# Patient Record
Sex: FEMALE | Race: WHITE
[De-identification: names, ages, dates, MRNs, and addresses within clinical notes are randomized per-mention and may not be internally consistent; named-entity substitution may affect disease eponyms.]

---

## 2017-12-20 ENCOUNTER — HOSPITAL ENCOUNTER (INPATIENT)
Dept: HOSPITAL 12 - ER | Age: 57
LOS: 4 days | Discharge: HOME | DRG: 241 | End: 2017-12-24
Attending: INTERNAL MEDICINE
Payer: COMMERCIAL

## 2017-12-20 VITALS — SYSTOLIC BLOOD PRESSURE: 91 MMHG | DIASTOLIC BLOOD PRESSURE: 43 MMHG

## 2017-12-20 VITALS — SYSTOLIC BLOOD PRESSURE: 101 MMHG | DIASTOLIC BLOOD PRESSURE: 51 MMHG

## 2017-12-20 VITALS — SYSTOLIC BLOOD PRESSURE: 95 MMHG | DIASTOLIC BLOOD PRESSURE: 51 MMHG

## 2017-12-20 VITALS — DIASTOLIC BLOOD PRESSURE: 67 MMHG | SYSTOLIC BLOOD PRESSURE: 125 MMHG

## 2017-12-20 VITALS — HEIGHT: 66 IN | BODY MASS INDEX: 14.63 KG/M2 | WEIGHT: 91 LBS

## 2017-12-20 VITALS — SYSTOLIC BLOOD PRESSURE: 80 MMHG | DIASTOLIC BLOOD PRESSURE: 44 MMHG

## 2017-12-20 VITALS — SYSTOLIC BLOOD PRESSURE: 86 MMHG | DIASTOLIC BLOOD PRESSURE: 57 MMHG

## 2017-12-20 VITALS — DIASTOLIC BLOOD PRESSURE: 57 MMHG | SYSTOLIC BLOOD PRESSURE: 86 MMHG

## 2017-12-20 VITALS — SYSTOLIC BLOOD PRESSURE: 97 MMHG | DIASTOLIC BLOOD PRESSURE: 50 MMHG

## 2017-12-20 VITALS — SYSTOLIC BLOOD PRESSURE: 91 MMHG | DIASTOLIC BLOOD PRESSURE: 51 MMHG

## 2017-12-20 VITALS — DIASTOLIC BLOOD PRESSURE: 46 MMHG | SYSTOLIC BLOOD PRESSURE: 91 MMHG

## 2017-12-20 VITALS — DIASTOLIC BLOOD PRESSURE: 44 MMHG | SYSTOLIC BLOOD PRESSURE: 86 MMHG

## 2017-12-20 VITALS — DIASTOLIC BLOOD PRESSURE: 47 MMHG | SYSTOLIC BLOOD PRESSURE: 95 MMHG

## 2017-12-20 VITALS — DIASTOLIC BLOOD PRESSURE: 52 MMHG | SYSTOLIC BLOOD PRESSURE: 95 MMHG

## 2017-12-20 VITALS — DIASTOLIC BLOOD PRESSURE: 45 MMHG | SYSTOLIC BLOOD PRESSURE: 92 MMHG

## 2017-12-20 VITALS — SYSTOLIC BLOOD PRESSURE: 88 MMHG | DIASTOLIC BLOOD PRESSURE: 53 MMHG

## 2017-12-20 VITALS — DIASTOLIC BLOOD PRESSURE: 55 MMHG | SYSTOLIC BLOOD PRESSURE: 88 MMHG

## 2017-12-20 VITALS — DIASTOLIC BLOOD PRESSURE: 50 MMHG | SYSTOLIC BLOOD PRESSURE: 105 MMHG

## 2017-12-20 VITALS — DIASTOLIC BLOOD PRESSURE: 47 MMHG | SYSTOLIC BLOOD PRESSURE: 88 MMHG

## 2017-12-20 VITALS — DIASTOLIC BLOOD PRESSURE: 47 MMHG | SYSTOLIC BLOOD PRESSURE: 89 MMHG

## 2017-12-20 DIAGNOSIS — I95.1: ICD-10-CM

## 2017-12-20 DIAGNOSIS — E86.0: ICD-10-CM

## 2017-12-20 DIAGNOSIS — B96.81: ICD-10-CM

## 2017-12-20 DIAGNOSIS — R22.0: ICD-10-CM

## 2017-12-20 DIAGNOSIS — Z80.41: ICD-10-CM

## 2017-12-20 DIAGNOSIS — K25.4: Primary | ICD-10-CM

## 2017-12-20 DIAGNOSIS — J44.9: ICD-10-CM

## 2017-12-20 DIAGNOSIS — K59.00: ICD-10-CM

## 2017-12-20 DIAGNOSIS — E83.51: ICD-10-CM

## 2017-12-20 DIAGNOSIS — Z80.0: ICD-10-CM

## 2017-12-20 DIAGNOSIS — Z80.1: ICD-10-CM

## 2017-12-20 DIAGNOSIS — J32.0: ICD-10-CM

## 2017-12-20 DIAGNOSIS — E83.42: ICD-10-CM

## 2017-12-20 DIAGNOSIS — I07.1: ICD-10-CM

## 2017-12-20 DIAGNOSIS — E53.8: ICD-10-CM

## 2017-12-20 DIAGNOSIS — K29.70: ICD-10-CM

## 2017-12-20 DIAGNOSIS — E43: ICD-10-CM

## 2017-12-20 DIAGNOSIS — R32: ICD-10-CM

## 2017-12-20 DIAGNOSIS — F32.9: ICD-10-CM

## 2017-12-20 DIAGNOSIS — G93.40: ICD-10-CM

## 2017-12-20 DIAGNOSIS — R57.1: ICD-10-CM

## 2017-12-20 DIAGNOSIS — M48.56XA: ICD-10-CM

## 2017-12-20 DIAGNOSIS — E87.2: ICD-10-CM

## 2017-12-20 DIAGNOSIS — K22.8: ICD-10-CM

## 2017-12-20 DIAGNOSIS — E83.39: ICD-10-CM

## 2017-12-20 DIAGNOSIS — C16.0: ICD-10-CM

## 2017-12-20 DIAGNOSIS — G89.29: ICD-10-CM

## 2017-12-20 DIAGNOSIS — M43.12: ICD-10-CM

## 2017-12-20 DIAGNOSIS — M19.90: ICD-10-CM

## 2017-12-20 DIAGNOSIS — D62: ICD-10-CM

## 2017-12-20 DIAGNOSIS — F17.210: ICD-10-CM

## 2017-12-20 DIAGNOSIS — C15.8: ICD-10-CM

## 2017-12-20 LAB
ALP SERPL-CCNC: 40 U/L (ref 50–136)
ALP SERPL-CCNC: 43 U/L (ref 50–136)
ALT SERPL W/O P-5'-P-CCNC: 7 U/L (ref 14–59)
ALT SERPL W/O P-5'-P-CCNC: 9 U/L (ref 14–59)
AST SERPL-CCNC: 14 U/L (ref 15–37)
AST SERPL-CCNC: 17 U/L (ref 15–37)
BASOPHILS # BLD AUTO: 0 K/UL (ref 0–8)
BASOPHILS # BLD AUTO: 0 K/UL (ref 0–8)
BASOPHILS NFR BLD AUTO: 0.3 % (ref 0–2)
BASOPHILS NFR BLD AUTO: 0.4 % (ref 0–2)
BILIRUB DIRECT SERPL-MCNC: 0.1 MG/DL (ref 0–0.2)
BILIRUB SERPL-MCNC: 0.4 MG/DL (ref 0.2–1)
BILIRUB SERPL-MCNC: 0.6 MG/DL (ref 0.2–1)
BUN SERPL-MCNC: 35 MG/DL (ref 7–18)
BUN SERPL-MCNC: 55 MG/DL (ref 7–18)
CHLORIDE SERPL-SCNC: 107 MMOL/L (ref 98–107)
CHLORIDE SERPL-SCNC: 110 MMOL/L (ref 98–107)
CO2 SERPL-SCNC: 28 MMOL/L (ref 21–32)
CO2 SERPL-SCNC: 29 MMOL/L (ref 21–32)
CREAT SERPL-MCNC: 0.6 MG/DL (ref 0.6–1.3)
CREAT SERPL-MCNC: 0.8 MG/DL (ref 0.6–1.3)
EOSINOPHIL # BLD AUTO: 0 K/UL (ref 0–0.7)
EOSINOPHIL # BLD AUTO: 0.1 K/UL (ref 0–0.7)
EOSINOPHIL NFR BLD AUTO: 0.3 % (ref 0–7)
EOSINOPHIL NFR BLD AUTO: 0.7 % (ref 0–7)
GLUCOSE SERPL-MCNC: 123 MG/DL (ref 74–106)
GLUCOSE SERPL-MCNC: 81 MG/DL (ref 74–106)
HCT VFR BLD AUTO: 19.8 % (ref 31.2–41.9)
HCT VFR BLD AUTO: 22.7 % (ref 31.2–41.9)
HCT VFR BLD AUTO: 26.7 % (ref 31.2–41.9)
HGB BLD-MCNC: 6.5 G/DL (ref 10.9–14.3)
HGB BLD-MCNC: 7.5 G/DL (ref 10.9–14.3)
HGB BLD-MCNC: 9.1 G/DL (ref 10.9–14.3)
IRON SERPL-MCNC: 34 UG/DL (ref 50–175)
LYMPHOCYTES # BLD AUTO: 1.4 K/UL (ref 20–40)
LYMPHOCYTES # BLD AUTO: 1.7 K/UL (ref 20–40)
LYMPHOCYTES NFR BLD AUTO: 12 % (ref 20.5–51.5)
LYMPHOCYTES NFR BLD AUTO: 16.1 % (ref 20.5–51.5)
LYMPHOCYTES NFR BLD MANUAL: 11 % (ref 20–40)
MAGNESIUM SERPL-MCNC: 1.6 MG/DL (ref 1.8–2.4)
MCH RBC QN AUTO: 27.8 UUG (ref 24.7–32.8)
MCH RBC QN AUTO: 28 UUG (ref 24.7–32.8)
MCHC RBC AUTO-ENTMCNC: 33 G/DL (ref 32.3–35.6)
MCHC RBC AUTO-ENTMCNC: 33 G/DL (ref 32.3–35.6)
MCV RBC AUTO: 84.4 FL (ref 75.5–95.3)
MCV RBC AUTO: 85.4 FL (ref 75.5–95.3)
MONOCYTES # BLD AUTO: 0.4 K/UL (ref 2–10)
MONOCYTES # BLD AUTO: 0.8 K/UL (ref 2–10)
MONOCYTES NFR BLD AUTO: 5.2 % (ref 0–11)
MONOCYTES NFR BLD AUTO: 6 % (ref 0–11)
MONOCYTES NFR BLD MANUAL: 6 % (ref 2–10)
NEUTROPHILS # BLD AUTO: 11.5 K/UL (ref 1.8–8.9)
NEUTROPHILS # BLD AUTO: 6.6 K/UL (ref 1.8–8.9)
NEUTROPHILS NFR BLD AUTO: 77.6 % (ref 38.5–71.5)
NEUTROPHILS NFR BLD AUTO: 81.4 % (ref 38.5–71.5)
NEUTS SEG NFR BLD MANUAL: 83 % (ref 42–75)
PHOSPHATE SERPL-MCNC: 2.2 MG/DL (ref 2.5–4.9)
PLATELET # BLD AUTO: 220 K/UL (ref 179–408)
PLATELET # BLD AUTO: 272 K/UL (ref 179–408)
POTASSIUM SERPL-SCNC: 3.8 MMOL/L (ref 3.5–5.1)
POTASSIUM SERPL-SCNC: 3.8 MMOL/L (ref 3.5–5.1)
RBC # BLD AUTO: 2.32 MIL/UL (ref 3.63–4.92)
RBC # BLD AUTO: 2.69 MIL/UL (ref 3.63–4.92)
WBC # BLD AUTO: 14.1 K/UL (ref 3.8–11.8)
WBC # BLD AUTO: 8.6 K/UL (ref 3.8–11.8)
WS STN SPEC: 5 G/DL (ref 6.4–8.2)
WS STN SPEC: 5 G/DL (ref 6.4–8.2)

## 2017-12-20 PROCEDURE — P9021 RED BLOOD CELLS UNIT: HCPCS

## 2017-12-20 PROCEDURE — C9113 INJ PANTOPRAZOLE SODIUM, VIA: HCPCS

## 2017-12-20 PROCEDURE — A4217 STERILE WATER/SALINE, 500 ML: HCPCS

## 2017-12-20 PROCEDURE — 30233N1 TRANSFUSION OF NONAUTOLOGOUS RED BLOOD CELLS INTO PERIPHERAL VEIN, PERCUTANEOUS APPROACH: ICD-10-PCS | Performed by: INTERNAL MEDICINE

## 2017-12-20 PROCEDURE — A4663 DIALYSIS BLOOD PRESSURE CUFF: HCPCS

## 2017-12-20 RX ADMIN — CYANOCOBALAMIN SCH MCG: 1000 INJECTION, SOLUTION INTRAMUSCULAR at 22:31

## 2017-12-20 RX ADMIN — NICOTINE SCH MG: 21 PATCH, EXTENDED RELEASE TOPICAL at 21:49

## 2017-12-20 RX ADMIN — DEXTROSE SCH MG: 50 INJECTION, SOLUTION INTRAVENOUS at 07:20

## 2017-12-20 RX ADMIN — DEXTROSE PRN MLS/HR: 50 INJECTION, SOLUTION INTRAVENOUS at 21:47

## 2017-12-20 RX ADMIN — DEXTROSE SCH MG: 50 INJECTION, SOLUTION INTRAVENOUS at 21:48

## 2017-12-20 RX ADMIN — DEXTROSE SCH MLS/HR: 50 INJECTION, SOLUTION INTRAVENOUS at 21:47

## 2017-12-20 NOTE — NUR
Pt well tolerated both blood transfusion bags latest v/s 94/51, 75 HR, 15 RR, 100% on RA, 
and temp 98.6. Tylenol administered per PRN elevated temp and effectively reduced from 100.1 
previously. Pt denies any SOB, c/o pain, and discomfort. Plan of care discussed for EGD to 
be done first thing in the morning and verbalized understanding of NPO status after 
midnight, with mechanical soft diet approved for prior snacks and meds. All safety and 
comfort measures met. Call light within and daughter visiting at bedside. Will continue to 
monitor and endorse shift changes to oncoming night shift.

## 2017-12-20 NOTE — NUR
PT IN ROOM ALERT IN NO ACUTE DISTRESS. NO EPISODES OF VOMITTING OR GI BLEEDING. ABLE TO MAKE 
NEEDS KNOWN. CALL MADE TO DR BARAKAT AWAITING FOR ADMITTING ORDERS. CONTINUE TO 
MONITOR. 3 SIDE RAILS RAISED. CALL LIGHT PLACED WITHIN REACH. PT REMAINING NPO STATUS AT 
THIS TIME.

## 2017-12-20 NOTE — NUR
Pt. admitted to [facility]  , under care of Dr. ROSY Gallagher List completed.

REPORT GIVEN TO ABBY RECINOS

## 2017-12-20 NOTE — NUR
Pt seen by MD, new orders received. Will continue to monitor Pt and endorse to night shift. 
Call light within reach and family at bedside. Pt reports feeling fine following blood 
transfusions with no c/o pain and able to void with ease.

## 2017-12-20 NOTE — NUR
PT WAS NOT GIVEN THE MORNING DOSE OF IV PROTONIX SINCE THERE WAS A NIGHT TIME DOES ORDERED 
FOR THE TIME. PT WAS NOT GIVEN POTASSIUM PHOSPHATE IVPB BECAUSE NONE WAS AVAILABLE. MD 
NOTIFIED AND D/C'ED THE ORDER.

## 2017-12-20 NOTE — NUR
SBAR report received near bedside. Pt assessed to be in no acute distress, no pain, no SOB. 
Pt initial v/s 97/50, 99% RA, 19 rr, 98.3, 76 HR. No s/s of bleeding at this time. Pt able 
to void x1 without complication. Will initiate first of two blood bags for transfusion and 
document accordingly. Reportable s/s of allergic reaction discussed. Will continue to 
monitor closely.

## 2017-12-20 NOTE — NUR
PT IN ROOM ALERT AWAKE ADMITTED TO TELEMETRY IN NO ACUTE DISTRESS. NO EPISODE OF VOMITTING 
AT THIS TIME. ABLE TO FOLLOW SIMPLE COMMANDS. PT STATES DIZZINESS AND TO BE INFUSED WITH 
PRBCS AS PER MD. CALL LIGHT PLACED WITHIN REACH. CONTINUE TO MONITOR.

## 2017-12-21 VITALS — SYSTOLIC BLOOD PRESSURE: 109 MMHG | DIASTOLIC BLOOD PRESSURE: 47 MMHG

## 2017-12-21 VITALS — DIASTOLIC BLOOD PRESSURE: 58 MMHG | SYSTOLIC BLOOD PRESSURE: 120 MMHG

## 2017-12-21 VITALS — SYSTOLIC BLOOD PRESSURE: 124 MMHG | DIASTOLIC BLOOD PRESSURE: 65 MMHG

## 2017-12-21 VITALS — DIASTOLIC BLOOD PRESSURE: 49 MMHG | SYSTOLIC BLOOD PRESSURE: 104 MMHG

## 2017-12-21 VITALS — DIASTOLIC BLOOD PRESSURE: 42 MMHG | SYSTOLIC BLOOD PRESSURE: 109 MMHG

## 2017-12-21 LAB
ALP SERPL-CCNC: 39 U/L (ref 50–136)
ALT SERPL W/O P-5'-P-CCNC: 7 U/L (ref 14–59)
APPEARANCE UR: CLEAR
AST SERPL-CCNC: 19 U/L (ref 15–37)
BASOPHILS # BLD AUTO: 0 K/UL (ref 0–8)
BASOPHILS NFR BLD AUTO: 0.4 % (ref 0–2)
BILIRUB SERPL-MCNC: 0.4 MG/DL (ref 0.2–1)
BILIRUB UR QL STRIP: NEGATIVE
BUN SERPL-MCNC: 18 MG/DL (ref 7–18)
CANCER AG125 SERPL-ACNC: 31 U/ML (ref 0–38.1)
CANCER AG19-9 SERPL-ACNC: 4 U/ML (ref 0–35)
CHLORIDE SERPL-SCNC: 112 MMOL/L (ref 98–107)
CHOLEST SERPL-MCNC: 100 MG/DL (ref ?–200)
CO2 SERPL-SCNC: 29 MMOL/L (ref 21–32)
COLOR UR: YELLOW
CREAT SERPL-MCNC: 0.6 MG/DL (ref 0.6–1.3)
DEPRECATED SQUAMOUS URNS QL MICRO: (no result) /HPF
EOSINOPHIL # BLD AUTO: 0.2 K/UL (ref 0–0.7)
EOSINOPHIL NFR BLD AUTO: 2.4 % (ref 0–7)
GLUCOSE SERPL-MCNC: 94 MG/DL (ref 74–106)
GLUCOSE UR STRIP-MCNC: NEGATIVE MG/DL
HCT VFR BLD AUTO: 26.5 % (ref 31.2–41.9)
HCT VFR BLD AUTO: 26.6 % (ref 31.2–41.9)
HDLC SERPL-MCNC: 32 MG/DL (ref 40–60)
HGB BLD-MCNC: 9.1 G/DL (ref 10.9–14.3)
HGB BLD-MCNC: 9.2 G/DL (ref 10.9–14.3)
KETONES UR STRIP-MCNC: NEGATIVE MG/DL
LEUKOCYTE ESTERASE UR QL STRIP: NEGATIVE
LYMPHOCYTES # BLD AUTO: 1.3 K/UL (ref 20–40)
LYMPHOCYTES NFR BLD AUTO: 20.3 % (ref 20.5–51.5)
MAGNESIUM SERPL-MCNC: 2 MG/DL (ref 1.8–2.4)
MCH RBC QN AUTO: 28.8 UUG (ref 24.7–32.8)
MCHC RBC AUTO-ENTMCNC: 34 G/DL (ref 32.3–35.6)
MCV RBC AUTO: 84.6 FL (ref 75.5–95.3)
MONOCYTES # BLD AUTO: 0.4 K/UL (ref 2–10)
MONOCYTES NFR BLD AUTO: 6.4 % (ref 0–11)
NEUTROPHILS # BLD AUTO: 4.6 K/UL (ref 1.8–8.9)
NEUTROPHILS NFR BLD AUTO: 70.5 % (ref 38.5–71.5)
NITRITE UR QL STRIP: NEGATIVE
PH UR STRIP: 6 [PH] (ref 5–8)
PHOSPHATE SERPL-MCNC: 1.8 MG/DL (ref 2.5–4.9)
PLATELET # BLD AUTO: 207 K/UL (ref 179–408)
POTASSIUM SERPL-SCNC: 3.9 MMOL/L (ref 3.5–5.1)
PROT UR QL STRIP: NEGATIVE
RBC # BLD AUTO: 3.14 MIL/UL (ref 3.63–4.92)
RBC #/AREA URNS HPF: (no result) /HPF (ref 0–3)
SP GR UR STRIP: 1.01 (ref 1–1.03)
TRIGL SERPL-MCNC: 89 MG/DL (ref 30–150)
TSH SERPL DL<=0.005 MIU/L-ACNC: 0.53 MIU/ML (ref 0.36–3.74)
UROBILINOGEN UR STRIP-MCNC: 0.2 E.U./DL
WBC # BLD AUTO: 6.6 K/UL (ref 3.8–11.8)
WBC #/AREA URNS HPF: (no result) /HPF
WBC #/AREA URNS HPF: (no result) /HPF (ref 0–3)
WS STN SPEC: 4.7 G/DL (ref 6.4–8.2)

## 2017-12-21 PROCEDURE — 0DB48ZX EXCISION OF ESOPHAGOGASTRIC JUNCTION, VIA NATURAL OR ARTIFICIAL OPENING ENDOSCOPIC, DIAGNOSTIC: ICD-10-PCS | Performed by: SURGERY

## 2017-12-21 PROCEDURE — 0DB78ZX EXCISION OF STOMACH, PYLORUS, VIA NATURAL OR ARTIFICIAL OPENING ENDOSCOPIC, DIAGNOSTIC: ICD-10-PCS | Performed by: SURGERY

## 2017-12-21 RX ADMIN — DOCUSATE SODIUM SCH MG: 100 CAPSULE, LIQUID FILLED ORAL at 13:47

## 2017-12-21 RX ADMIN — CYANOCOBALAMIN SCH MCG: 1000 INJECTION, SOLUTION INTRAMUSCULAR at 08:44

## 2017-12-21 RX ADMIN — DEXTROSE PRN MLS/HR: 50 INJECTION, SOLUTION INTRAVENOUS at 12:22

## 2017-12-21 RX ADMIN — DEXTROSE SCH MG: 50 INJECTION, SOLUTION INTRAVENOUS at 08:44

## 2017-12-21 RX ADMIN — NICOTINE SCH MG: 21 PATCH, EXTENDED RELEASE TOPICAL at 08:44

## 2017-12-21 RX ADMIN — PANTOPRAZOLE SODIUM SCH MG: 40 TABLET, DELAYED RELEASE ORAL at 17:11

## 2017-12-21 RX ADMIN — DOCUSATE SODIUM SCH MG: 100 CAPSULE, LIQUID FILLED ORAL at 20:37

## 2017-12-21 RX ADMIN — DEXTROSE SCH MLS/HR: 50 INJECTION, SOLUTION INTRAVENOUS at 11:18

## 2017-12-21 NOTE — NUR
NSG: RECEIVED PT LAYING IN BED IN HER ROOM ,ALERT/ORIENTED IN NO ACUTE DISTRESS. NO EPISODES 
OF VOMITTING OR GI BLEEDING. ABLE TO MAKE NEEDS KNOWN. CONTINUE TO MONITOR.  SIDE RAILS 
RAISED. CALL LIGHT PLACED WITHIN REACH.

## 2017-12-21 NOTE — NUR
PT SLEPT WELL THROUGH THE NIGHT AND WAS EASILY AWOKEN, PT DENIED HAVING ANY PAIN OR 
DIFFICULTY BREATHING, PT DENIED HAVING ANY NAUSEA. PT DID HAVE ONE EPISODE OF DIZZINESS 
WHILE STANDING AT START OF SHIFT.PT HAS BEEN NPO AFTER MIDNIGHT, NO S/S OF BLEEDING NOTED. 
ALL NEEDS MET, SAFETY MEASURES ARE IN PLACE, CALL LIGHT WITHIN REACH, BED ALARM IS ON.

## 2017-12-21 NOTE — NUR
Patient been in bed resting. No s/s of distress noted. Patient been cooperative with all 
procedures and interventions. Iv fluids are running now. Two iv sites are intact. Patient 
been able to tolerate her meals well, no n/v during my shift. Safety and comfort provided. 
Will continue monitoring.

## 2017-12-22 VITALS — SYSTOLIC BLOOD PRESSURE: 94 MMHG | DIASTOLIC BLOOD PRESSURE: 53 MMHG

## 2017-12-22 VITALS — DIASTOLIC BLOOD PRESSURE: 49 MMHG | SYSTOLIC BLOOD PRESSURE: 100 MMHG

## 2017-12-22 VITALS — DIASTOLIC BLOOD PRESSURE: 72 MMHG | SYSTOLIC BLOOD PRESSURE: 157 MMHG

## 2017-12-22 VITALS — DIASTOLIC BLOOD PRESSURE: 55 MMHG | SYSTOLIC BLOOD PRESSURE: 95 MMHG

## 2017-12-22 VITALS — DIASTOLIC BLOOD PRESSURE: 52 MMHG | SYSTOLIC BLOOD PRESSURE: 94 MMHG

## 2017-12-22 VITALS — SYSTOLIC BLOOD PRESSURE: 128 MMHG | DIASTOLIC BLOOD PRESSURE: 63 MMHG

## 2017-12-22 LAB
ALP SERPL-CCNC: 40 U/L (ref 50–136)
ALT SERPL W/O P-5'-P-CCNC: 8 U/L (ref 14–59)
AST SERPL-CCNC: 18 U/L (ref 15–37)
BASOPHILS # BLD AUTO: 0 K/UL (ref 0–8)
BASOPHILS NFR BLD AUTO: 0.7 % (ref 0–2)
BILIRUB SERPL-MCNC: 0.2 MG/DL (ref 0.2–1)
BUN SERPL-MCNC: 11 MG/DL (ref 7–18)
CHLORIDE SERPL-SCNC: 110 MMOL/L (ref 98–107)
CO2 SERPL-SCNC: 27 MMOL/L (ref 21–32)
CREAT SERPL-MCNC: 0.6 MG/DL (ref 0.6–1.3)
EOSINOPHIL # BLD AUTO: 0.1 K/UL (ref 0–0.7)
EOSINOPHIL NFR BLD AUTO: 2.4 % (ref 0–7)
GLUCOSE SERPL-MCNC: 102 MG/DL (ref 74–106)
HCT VFR BLD AUTO: 26.1 % (ref 31.2–41.9)
HGB BLD-MCNC: 8.9 G/DL (ref 10.9–14.3)
LYMPHOCYTES # BLD AUTO: 1.2 K/UL (ref 20–40)
LYMPHOCYTES NFR BLD AUTO: 19.4 % (ref 20.5–51.5)
MAGNESIUM SERPL-MCNC: 1.8 MG/DL (ref 1.8–2.4)
MCH RBC QN AUTO: 29.1 UUG (ref 24.7–32.8)
MCHC RBC AUTO-ENTMCNC: 34 G/DL (ref 32.3–35.6)
MCV RBC AUTO: 85.6 FL (ref 75.5–95.3)
MONOCYTES # BLD AUTO: 0.5 K/UL (ref 2–10)
MONOCYTES NFR BLD AUTO: 7.6 % (ref 0–11)
NEUTROPHILS # BLD AUTO: 4.2 K/UL (ref 1.8–8.9)
NEUTROPHILS NFR BLD AUTO: 69.9 % (ref 38.5–71.5)
PHOSPHATE SERPL-MCNC: 2 MG/DL (ref 2.5–4.9)
PLATELET # BLD AUTO: 210 K/UL (ref 179–408)
POTASSIUM SERPL-SCNC: 4 MMOL/L (ref 3.5–5.1)
RBC # BLD AUTO: 3.05 MIL/UL (ref 3.63–4.92)
WBC # BLD AUTO: 6 K/UL (ref 3.8–11.8)
WS STN SPEC: 4.9 G/DL (ref 6.4–8.2)

## 2017-12-22 RX ADMIN — DOCUSATE SODIUM SCH MG: 100 CAPSULE, LIQUID FILLED ORAL at 20:39

## 2017-12-22 RX ADMIN — DOCUSATE SODIUM SCH MG: 100 CAPSULE, LIQUID FILLED ORAL at 09:00

## 2017-12-22 RX ADMIN — FERROUS SULFATE TAB 325 MG (65 MG ELEMENTAL FE) SCH MG: 325 (65 FE) TAB at 09:00

## 2017-12-22 RX ADMIN — CYANOCOBALAMIN SCH MCG: 1000 INJECTION, SOLUTION INTRAMUSCULAR at 09:00

## 2017-12-22 RX ADMIN — SODIUM CHLORIDE PRN MLS/HR: 0.9 INJECTION, SOLUTION INTRAVENOUS at 13:59

## 2017-12-22 RX ADMIN — FOLIC ACID SCH MG: 1 TABLET ORAL at 09:00

## 2017-12-22 RX ADMIN — NICOTINE SCH MG: 21 PATCH, EXTENDED RELEASE TOPICAL at 09:02

## 2017-12-22 RX ADMIN — PANTOPRAZOLE SODIUM SCH MG: 40 TABLET, DELAYED RELEASE ORAL at 17:06

## 2017-12-22 RX ADMIN — LEVOFLOXACIN SCH MLS/HR: 500 INJECTION, SOLUTION INTRAVENOUS at 09:00

## 2017-12-22 RX ADMIN — PANTOPRAZOLE SODIUM SCH MG: 40 TABLET, DELAYED RELEASE ORAL at 06:21

## 2017-12-22 NOTE — NUR
NSG: REMAIN CALM AND COOPERATIVE. PT SLEPT WELL THROUGH THE NIGHT, KLONOPIN 0.5 MG FOR SLEEP 
EFFECTIVE. PT DENIED PAIN OR SOB @ THIS TIME, NO C/O NAUSEA. PT DID HAVE ONE EPISODE OF 
ABDOMINAL PAIN. MORPHINE IV GIVEN  . NO S/S OF BLEEDING NOTED. ALL NEEDS MET, SAFETY 
MEASURES ARE IN PLACE, CALL LIGHT WITHIN REACH, BED ALARM IS ON. CONTINUE PLAN OF CARE.

## 2017-12-22 NOTE — NUR
Received patient wake, denies any pain/discomforts at this time beside her IV site. IVF 
stopped at this time. will monitor.

## 2017-12-22 NOTE — NUR
Inserted IV on left upper arm G # 20 x 1 attempt. Patient tolerated procedure well. KCl IV 
resumed.

## 2017-12-22 NOTE — NUR
Patient complained of pain on left hand IV site. Stated she "felt that her vein was gonna 
explode". Stopped IV fluids. Will continue to monitor if we need to reinsert new IV site.

## 2017-12-22 NOTE — NUR
Received patient on bed, awake, A& O x4. Able to make needs known. No acute distress noted. 
No complaints of pain at this time. Ambulatory. IV access on right and left forearms #20. No 
complaints of nausea and vomiting. All comfort measures provided. Call light within reach 
will continue to monitor closely.

## 2017-12-22 NOTE — NUR
IV Access on the left forearm was leaking. Removed non functioning IV and replaced with IV 
site on the left hand #22, using aseptic technique, good venous backflow noted.

## 2017-12-23 VITALS — DIASTOLIC BLOOD PRESSURE: 50 MMHG | SYSTOLIC BLOOD PRESSURE: 104 MMHG

## 2017-12-23 VITALS — SYSTOLIC BLOOD PRESSURE: 95 MMHG | DIASTOLIC BLOOD PRESSURE: 47 MMHG

## 2017-12-23 VITALS — DIASTOLIC BLOOD PRESSURE: 40 MMHG | SYSTOLIC BLOOD PRESSURE: 88 MMHG

## 2017-12-23 VITALS — SYSTOLIC BLOOD PRESSURE: 96 MMHG | DIASTOLIC BLOOD PRESSURE: 63 MMHG

## 2017-12-23 LAB
ALP SERPL-CCNC: 42 U/L (ref 50–136)
ALT SERPL W/O P-5'-P-CCNC: 9 U/L (ref 14–59)
AST SERPL-CCNC: 15 U/L (ref 15–37)
BASOPHILS # BLD AUTO: 0 K/UL (ref 0–8)
BASOPHILS NFR BLD AUTO: 0.9 % (ref 0–2)
BILIRUB SERPL-MCNC: 0.2 MG/DL (ref 0.2–1)
BUN SERPL-MCNC: 7 MG/DL (ref 7–18)
CHLORIDE SERPL-SCNC: 110 MMOL/L (ref 98–107)
CO2 SERPL-SCNC: 28 MMOL/L (ref 21–32)
CREAT SERPL-MCNC: 0.7 MG/DL (ref 0.6–1.3)
EOSINOPHIL # BLD AUTO: 0.2 K/UL (ref 0–0.7)
EOSINOPHIL NFR BLD AUTO: 3.4 % (ref 0–7)
GLUCOSE SERPL-MCNC: 85 MG/DL (ref 74–106)
HCT VFR BLD AUTO: 24.6 % (ref 31.2–41.9)
HGB BLD-MCNC: 8.3 G/DL (ref 10.9–14.3)
LYMPHOCYTES # BLD AUTO: 1.3 K/UL (ref 20–40)
LYMPHOCYTES NFR BLD AUTO: 23.2 % (ref 20.5–51.5)
MAGNESIUM SERPL-MCNC: 1.8 MG/DL (ref 1.8–2.4)
MCH RBC QN AUTO: 29 UUG (ref 24.7–32.8)
MCHC RBC AUTO-ENTMCNC: 34 G/DL (ref 32.3–35.6)
MCV RBC AUTO: 86.4 FL (ref 75.5–95.3)
MONOCYTES # BLD AUTO: 0.4 K/UL (ref 2–10)
MONOCYTES NFR BLD AUTO: 6.5 % (ref 0–11)
NEUTROPHILS # BLD AUTO: 3.7 K/UL (ref 1.8–8.9)
NEUTROPHILS NFR BLD AUTO: 66 % (ref 38.5–71.5)
PHOSPHATE SERPL-MCNC: 2.3 MG/DL (ref 2.5–4.9)
PLATELET # BLD AUTO: 247 K/UL (ref 179–408)
POTASSIUM SERPL-SCNC: 3.9 MMOL/L (ref 3.5–5.1)
RBC # BLD AUTO: 2.85 MIL/UL (ref 3.63–4.92)
WBC # BLD AUTO: 5.5 K/UL (ref 3.8–11.8)
WS STN SPEC: 4.6 G/DL (ref 6.4–8.2)

## 2017-12-23 RX ADMIN — FERROUS SULFATE TAB 325 MG (65 MG ELEMENTAL FE) SCH MG: 325 (65 FE) TAB at 09:26

## 2017-12-23 RX ADMIN — PANTOPRAZOLE SODIUM SCH MG: 40 TABLET, DELAYED RELEASE ORAL at 16:31

## 2017-12-23 RX ADMIN — NICOTINE SCH MG: 21 PATCH, EXTENDED RELEASE TOPICAL at 09:26

## 2017-12-23 RX ADMIN — DOCUSATE SODIUM SCH MG: 100 CAPSULE, LIQUID FILLED ORAL at 09:26

## 2017-12-23 RX ADMIN — CYANOCOBALAMIN SCH MCG: 1000 INJECTION, SOLUTION INTRAMUSCULAR at 09:26

## 2017-12-23 RX ADMIN — SODIUM CHLORIDE PRN MLS/HR: 0.9 INJECTION, SOLUTION INTRAVENOUS at 17:03

## 2017-12-23 RX ADMIN — FOLIC ACID SCH MG: 1 TABLET ORAL at 09:26

## 2017-12-23 RX ADMIN — PANTOPRAZOLE SODIUM SCH MG: 40 TABLET, DELAYED RELEASE ORAL at 05:34

## 2017-12-23 RX ADMIN — LEVOFLOXACIN SCH MLS/HR: 500 INJECTION, SOLUTION INTRAVENOUS at 09:26

## 2017-12-23 NOTE — NUR
Was about to administer AM medications, noticed that IV site needed to be flushed due to 
backflow of blood, informed patient, patient got upset saying she can't take this anymore, I 
tried to calm her down saying that I was just going to flush the site not reinsert a new IV 
site, but patient wasn't listening and continued to get very upset. She asked me to leave 
the room. After a few minutes asked Yaly RN to accompany me to check if patient calmed down, 
and try to administer AM medications again. Patient had calm down and allowed medication 
administration.

## 2017-12-23 NOTE — NUR
PATIENT A/OX3, STABLE CONDITION, NO S/S OF DISTRESS. RESTING COMFORTABLY IN BED. DOES NOT 
COMPLAIN OF PAIN. TEMPERATURE WNL. COMFORT SAFETY WILL BE PROVIDED.

## 2017-12-23 NOTE — NUR
Received patient on bed, awake, A & O x 4. Able to make needs known. No acute distress 
noted. No complaints of pain/discomfort at this time. IV access on left upper arm #20 with 
IV fluids of NS running at 75 cc/hr, well tolerated. No complaints of nausea and vomiting. 
All comfort measures provided. Safety and fall precautions observed at all times. Call light 
within reach, and answered in a timely manner. will continue to monitor closely.

## 2017-12-23 NOTE — NUR
Slept good. No complaint presented all night. All needs attended and met. No active 
bleeding, nausea and vomiting. No significant event reported all night. Continue current 
plan of care.

## 2017-12-23 NOTE — NUR
Seen and examined by Dr. Jhaveri Oncologist. Dr. Jhaveri explained plan of care. Patient stated 
that she wanted to go home, Dr. Jhaveri said that Nurse needs to get order from PCP. I called 
Clare Hogan NP to inform her that Dr. Jhaveri came to see pt, and aked if we could get an 
order to discharge patient. NP said no because of low hemoglobin count. Informed and 
explained to patient that we can't get a discharge order yet, patient said she will leave 
[facility] this afternoon. Also informed [facility] Charge Nurse. Will continue to monitor

## 2017-12-24 VITALS — DIASTOLIC BLOOD PRESSURE: 50 MMHG | SYSTOLIC BLOOD PRESSURE: 91 MMHG

## 2017-12-24 VITALS — SYSTOLIC BLOOD PRESSURE: 96 MMHG | DIASTOLIC BLOOD PRESSURE: 54 MMHG

## 2017-12-24 VITALS — DIASTOLIC BLOOD PRESSURE: 54 MMHG | SYSTOLIC BLOOD PRESSURE: 105 MMHG

## 2017-12-24 VITALS — DIASTOLIC BLOOD PRESSURE: 49 MMHG | SYSTOLIC BLOOD PRESSURE: 90 MMHG

## 2017-12-24 LAB
ALP SERPL-CCNC: 47 U/L (ref 50–136)
ALT SERPL W/O P-5'-P-CCNC: 10 U/L (ref 14–59)
AST SERPL-CCNC: 21 U/L (ref 15–37)
BASOPHILS # BLD AUTO: 0 K/UL (ref 0–8)
BASOPHILS NFR BLD AUTO: 0.7 % (ref 0–2)
BILIRUB SERPL-MCNC: 0.2 MG/DL (ref 0.2–1)
BUN SERPL-MCNC: 10 MG/DL (ref 7–18)
CHLORIDE SERPL-SCNC: 106 MMOL/L (ref 98–107)
CO2 SERPL-SCNC: 31 MMOL/L (ref 21–32)
CREAT SERPL-MCNC: 0.7 MG/DL (ref 0.6–1.3)
EOSINOPHIL # BLD AUTO: 0.2 K/UL (ref 0–0.7)
EOSINOPHIL NFR BLD AUTO: 3.3 % (ref 0–7)
GLUCOSE SERPL-MCNC: 85 MG/DL (ref 74–106)
HCT VFR BLD AUTO: 23.1 % (ref 31.2–41.9)
HGB BLD-MCNC: 7.7 G/DL (ref 10.9–14.3)
LYMPHOCYTES # BLD AUTO: 1.8 K/UL (ref 20–40)
LYMPHOCYTES NFR BLD AUTO: 25.5 % (ref 20.5–51.5)
MAGNESIUM SERPL-MCNC: 1.7 MG/DL (ref 1.8–2.4)
MCH RBC QN AUTO: 29.1 UUG (ref 24.7–32.8)
MCHC RBC AUTO-ENTMCNC: 33 G/DL (ref 32.3–35.6)
MCV RBC AUTO: 87.1 FL (ref 75.5–95.3)
MONOCYTES # BLD AUTO: 0.5 K/UL (ref 2–10)
MONOCYTES NFR BLD AUTO: 7.4 % (ref 0–11)
NEUTROPHILS # BLD AUTO: 4.4 K/UL (ref 1.8–8.9)
NEUTROPHILS NFR BLD AUTO: 63.1 % (ref 38.5–71.5)
PHOSPHATE SERPL-MCNC: 2.7 MG/DL (ref 2.5–4.9)
PLATELET # BLD AUTO: 249 K/UL (ref 179–408)
POTASSIUM SERPL-SCNC: 4.1 MMOL/L (ref 3.5–5.1)
RBC # BLD AUTO: 2.65 MIL/UL (ref 3.63–4.92)
WBC # BLD AUTO: 6.9 K/UL (ref 3.8–11.8)
WS STN SPEC: 4.7 G/DL (ref 6.4–8.2)

## 2017-12-24 RX ADMIN — NICOTINE SCH MG: 21 PATCH, EXTENDED RELEASE TOPICAL at 09:36

## 2017-12-24 RX ADMIN — PANTOPRAZOLE SODIUM SCH MG: 40 TABLET, DELAYED RELEASE ORAL at 06:21

## 2017-12-24 RX ADMIN — FOLIC ACID SCH MG: 1 TABLET ORAL at 09:36

## 2017-12-24 RX ADMIN — CYANOCOBALAMIN SCH MCG: 1000 INJECTION, SOLUTION INTRAMUSCULAR at 09:36

## 2017-12-24 RX ADMIN — LEVOFLOXACIN SCH MLS/HR: 500 INJECTION, SOLUTION INTRAVENOUS at 09:36

## 2017-12-24 RX ADMIN — SODIUM CHLORIDE PRN MLS/HR: 0.9 INJECTION, SOLUTION INTRAVENOUS at 06:27

## 2017-12-24 RX ADMIN — FERROUS SULFATE TAB 325 MG (65 MG ELEMENTAL FE) SCH MG: 325 (65 FE) TAB at 09:36

## 2017-12-24 NOTE — NUR
PATIENT SLEPT THROUGH MAJORITY OF THE NIGHT. VITAL SIGNS STABLE, NO S/S OF DISTRESS, STABLE 
CONDITION. CALL LIGHT WITHIN REACH.

## 2017-12-24 NOTE — NUR
Received client in bed with the HOB at 45 degrees angle. Noted client with no SOB, no pain, 
no distress,and no discomfort. Client is awake, alert, and oriented times 4 and able to 
verbalize needs. Client verbalized that she wants to be discharged today.

## 2017-12-24 NOTE — NUR
Client refused. Waiting on the doctor to talk to him about getting discharged today

-------------------------------------------------------------------------------

Addendum: 12/24/17 at 1356 by SHON TAVERAS RN

-------------------------------------------------------------------------------

Amended: Links added.

## 2017-12-24 NOTE — NUR
Client has been discharged to home. Orders for discharge provide by on call hospital doctor. 
Client is stable. Client is aware of the importance of follow up care with primary doctor. 
Client was picked up by daughter, Marisa.

## 2019-05-02 ENCOUNTER — HOSPITAL ENCOUNTER (INPATIENT)
Dept: HOSPITAL 54 - ER | Age: 59
LOS: 1 days | Discharge: HOME | DRG: 663 | End: 2019-05-03
Attending: INTERNAL MEDICINE | Admitting: INTERNAL MEDICINE
Payer: COMMERCIAL

## 2019-05-02 VITALS — DIASTOLIC BLOOD PRESSURE: 75 MMHG | SYSTOLIC BLOOD PRESSURE: 129 MMHG

## 2019-05-02 VITALS — DIASTOLIC BLOOD PRESSURE: 69 MMHG | SYSTOLIC BLOOD PRESSURE: 113 MMHG

## 2019-05-02 VITALS — HEIGHT: 65 IN | WEIGHT: 93 LBS | BODY MASS INDEX: 15.49 KG/M2

## 2019-05-02 VITALS — SYSTOLIC BLOOD PRESSURE: 108 MMHG | DIASTOLIC BLOOD PRESSURE: 61 MMHG

## 2019-05-02 VITALS — SYSTOLIC BLOOD PRESSURE: 119 MMHG | DIASTOLIC BLOOD PRESSURE: 71 MMHG

## 2019-05-02 VITALS — SYSTOLIC BLOOD PRESSURE: 117 MMHG | DIASTOLIC BLOOD PRESSURE: 67 MMHG

## 2019-05-02 VITALS — DIASTOLIC BLOOD PRESSURE: 69 MMHG | SYSTOLIC BLOOD PRESSURE: 107 MMHG

## 2019-05-02 VITALS — SYSTOLIC BLOOD PRESSURE: 115 MMHG | DIASTOLIC BLOOD PRESSURE: 75 MMHG

## 2019-05-02 VITALS — DIASTOLIC BLOOD PRESSURE: 69 MMHG | SYSTOLIC BLOOD PRESSURE: 126 MMHG

## 2019-05-02 VITALS — DIASTOLIC BLOOD PRESSURE: 64 MMHG | SYSTOLIC BLOOD PRESSURE: 111 MMHG

## 2019-05-02 VITALS — SYSTOLIC BLOOD PRESSURE: 117 MMHG | DIASTOLIC BLOOD PRESSURE: 69 MMHG

## 2019-05-02 VITALS — DIASTOLIC BLOOD PRESSURE: 67 MMHG | SYSTOLIC BLOOD PRESSURE: 122 MMHG

## 2019-05-02 DIAGNOSIS — N17.0: ICD-10-CM

## 2019-05-02 DIAGNOSIS — K21.9: ICD-10-CM

## 2019-05-02 DIAGNOSIS — J21.9: ICD-10-CM

## 2019-05-02 DIAGNOSIS — D50.9: Primary | ICD-10-CM

## 2019-05-02 DIAGNOSIS — C79.49: ICD-10-CM

## 2019-05-02 DIAGNOSIS — C15.9: ICD-10-CM

## 2019-05-02 DIAGNOSIS — H40.9: ICD-10-CM

## 2019-05-02 DIAGNOSIS — I25.10: ICD-10-CM

## 2019-05-02 DIAGNOSIS — C78.6: ICD-10-CM

## 2019-05-02 DIAGNOSIS — Z88.0: ICD-10-CM

## 2019-05-02 DIAGNOSIS — J43.9: ICD-10-CM

## 2019-05-02 DIAGNOSIS — F17.210: ICD-10-CM

## 2019-05-02 DIAGNOSIS — K29.70: ICD-10-CM

## 2019-05-02 DIAGNOSIS — N18.9: ICD-10-CM

## 2019-05-02 LAB
ALBUMIN SERPL BCP-MCNC: 2.1 G/DL (ref 3.4–5)
ALP SERPL-CCNC: 80 U/L (ref 46–116)
ALT SERPL W P-5'-P-CCNC: 12 U/L (ref 12–78)
AST SERPL W P-5'-P-CCNC: 17 U/L (ref 15–37)
BASOPHILS # BLD AUTO: 0.1 /CMM (ref 0–0.2)
BASOPHILS NFR BLD AUTO: 0.6 % (ref 0–2)
BILIRUB DIRECT SERPL-MCNC: 0.1 MG/DL (ref 0–0.2)
BILIRUB SERPL-MCNC: 0.1 MG/DL (ref 0.2–1)
BUN SERPL-MCNC: 22 MG/DL (ref 7–18)
CALCIUM SERPL-MCNC: 7.9 MG/DL (ref 8.5–10.1)
CHLORIDE SERPL-SCNC: 103 MMOL/L (ref 98–107)
CO2 SERPL-SCNC: 31 MMOL/L (ref 21–32)
CREAT SERPL-MCNC: 1.9 MG/DL (ref 0.6–1.3)
EOSINOPHIL NFR BLD AUTO: 0.7 % (ref 0–6)
FERRITIN SERPL-MCNC: 18 NG/ML (ref 8–388)
GLUCOSE SERPL-MCNC: 107 MG/DL (ref 74–106)
HCT VFR BLD AUTO: 21 % (ref 33–45)
HGB BLD-MCNC: 6.7 G/DL (ref 11.5–14.8)
IRON SERPL-MCNC: 10 UG/DL (ref 50–175)
LIPASE SERPL-CCNC: 128 U/L (ref 73–393)
LYMPHOCYTES NFR BLD AUTO: 1.1 /CMM (ref 0.8–4.8)
LYMPHOCYTES NFR BLD AUTO: 8.2 % (ref 20–44)
LYMPHOCYTES NFR BLD MANUAL: 8 % (ref 16–48)
MCHC RBC AUTO-ENTMCNC: 31 G/DL (ref 31–36)
MCV RBC AUTO: 74 FL (ref 82–100)
MONOCYTES NFR BLD AUTO: 0.8 /CMM (ref 0.1–1.3)
MONOCYTES NFR BLD AUTO: 6.1 % (ref 2–12)
MONOCYTES NFR BLD MANUAL: 4 % (ref 0–11)
NEUTROPHILS # BLD AUTO: 11.2 /CMM (ref 1.8–8.9)
NEUTROPHILS NFR BLD AUTO: 84.4 % (ref 43–81)
NEUTS SEG NFR BLD MANUAL: 88 % (ref 42–76)
PH UR STRIP: 6.5 [PH] (ref 5–8)
PLATELET # BLD AUTO: 593 /CMM (ref 150–450)
POTASSIUM SERPL-SCNC: 3.7 MMOL/L (ref 3.5–5.1)
PROT SERPL-MCNC: 6.3 G/DL (ref 6.4–8.2)
PROT UR QL STRIP: 30 MG/DL
RBC # BLD AUTO: 2.9 MIL/UL (ref 4–5.2)
RBC #/AREA URNS HPF: (no result) /HPF (ref 0–2)
SODIUM SERPL-SCNC: 141 MMOL/L (ref 136–145)
TIBC SERPL-MCNC: 240 UG/DL (ref 250–450)
UROBILINOGEN UR STRIP-MCNC: 0.2 EU/DL
WBC #/AREA URNS HPF: (no result) /HPF (ref 0–3)
WBC NRBC COR # BLD AUTO: 13.3 K/UL (ref 4.3–11)

## 2019-05-02 PROCEDURE — A4216 STERILE WATER/SALINE, 10 ML: HCPCS

## 2019-05-02 PROCEDURE — C9113 INJ PANTOPRAZOLE SODIUM, VIA: HCPCS

## 2019-05-02 PROCEDURE — G0378 HOSPITAL OBSERVATION PER HR: HCPCS

## 2019-05-02 PROCEDURE — 30233N1 TRANSFUSION OF NONAUTOLOGOUS RED BLOOD CELLS INTO PERIPHERAL VEIN, PERCUTANEOUS APPROACH: ICD-10-PCS | Performed by: INTERNAL MEDICINE

## 2019-05-02 RX ADMIN — DEXTROSE MONOHYDRATE PRN MG: 50 INJECTION, SOLUTION INTRAVENOUS at 19:36

## 2019-05-02 RX ADMIN — SUCRALFATE SCH G: 1 SUSPENSION ORAL at 21:26

## 2019-05-02 RX ADMIN — Medication PRN MG: at 10:58

## 2019-05-02 RX ADMIN — SUCRALFATE SCH G: 1 SUSPENSION ORAL at 19:42

## 2019-05-02 RX ADMIN — SODIUM CHLORIDE SCH MG: 9 INJECTION, SOLUTION INTRAVENOUS at 09:00

## 2019-05-02 RX ADMIN — DEXTROSE MONOHYDRATE PRN MG: 50 INJECTION, SOLUTION INTRAVENOUS at 10:58

## 2019-05-02 RX ADMIN — Medication PRN MG: at 07:14

## 2019-05-02 RX ADMIN — Medication PRN MG: at 23:52

## 2019-05-03 VITALS — SYSTOLIC BLOOD PRESSURE: 118 MMHG | DIASTOLIC BLOOD PRESSURE: 61 MMHG

## 2019-05-03 VITALS — DIASTOLIC BLOOD PRESSURE: 75 MMHG | SYSTOLIC BLOOD PRESSURE: 110 MMHG

## 2019-05-03 LAB
BASOPHILS # BLD AUTO: 0 /CMM (ref 0–0.2)
BASOPHILS NFR BLD AUTO: 0.4 % (ref 0–2)
BUN SERPL-MCNC: 21 MG/DL (ref 7–18)
CALCIUM SERPL-MCNC: 8.7 MG/DL (ref 8.5–10.1)
CHLORIDE SERPL-SCNC: 107 MMOL/L (ref 98–107)
CHOLEST SERPL-MCNC: 153 MG/DL (ref ?–200)
CO2 SERPL-SCNC: 25 MMOL/L (ref 21–32)
CREAT SERPL-MCNC: 1.5 MG/DL (ref 0.6–1.3)
EOSINOPHIL NFR BLD AUTO: 1.3 % (ref 0–6)
GLUCOSE SERPL-MCNC: 96 MG/DL (ref 74–106)
HCT VFR BLD AUTO: 29 % (ref 33–45)
HDLC SERPL-MCNC: 52 MG/DL (ref 40–60)
HGB BLD-MCNC: 9.8 G/DL (ref 11.5–14.8)
LDLC SERPL DIRECT ASSAY-MCNC: 94 MG/DL (ref 0–99)
LYMPHOCYTES NFR BLD AUTO: 1 /CMM (ref 0.8–4.8)
LYMPHOCYTES NFR BLD AUTO: 11.3 % (ref 20–44)
MAGNESIUM SERPL-MCNC: 1.7 MG/DL (ref 1.8–2.4)
MCHC RBC AUTO-ENTMCNC: 34 G/DL (ref 31–36)
MCV RBC AUTO: 81 FL (ref 82–100)
MONOCYTES NFR BLD AUTO: 0.6 /CMM (ref 0.1–1.3)
MONOCYTES NFR BLD AUTO: 6.2 % (ref 2–12)
NEUTROPHILS # BLD AUTO: 7.3 /CMM (ref 1.8–8.9)
NEUTROPHILS NFR BLD AUTO: 80.8 % (ref 43–81)
PHOSPHATE SERPL-MCNC: 3.6 MG/DL (ref 2.5–4.9)
PLATELET # BLD AUTO: 477 /CMM (ref 150–450)
POTASSIUM SERPL-SCNC: 3.9 MMOL/L (ref 3.5–5.1)
RBC # BLD AUTO: 3.53 MIL/UL (ref 4–5.2)
SODIUM SERPL-SCNC: 140 MMOL/L (ref 136–145)
TRIGL SERPL-MCNC: 83 MG/DL (ref 30–150)
WBC NRBC COR # BLD AUTO: 9 K/UL (ref 4.3–11)

## 2019-05-03 RX ADMIN — SODIUM CHLORIDE SCH MG: 9 INJECTION, SOLUTION INTRAVENOUS at 08:01

## 2019-05-03 RX ADMIN — SUCRALFATE SCH G: 1 SUSPENSION ORAL at 08:01

## 2019-05-03 RX ADMIN — Medication PRN MG: at 04:38

## 2019-05-03 RX ADMIN — SUCRALFATE SCH G: 1 SUSPENSION ORAL at 11:47

## 2019-05-03 RX ADMIN — SUCRALFATE SCH G: 1 SUSPENSION ORAL at 17:30

## 2019-05-03 RX ADMIN — DEXTROSE MONOHYDRATE PRN MG: 50 INJECTION, SOLUTION INTRAVENOUS at 11:24
